# Patient Record
Sex: FEMALE | Race: WHITE | HISPANIC OR LATINO | Employment: PART TIME | URBAN - METROPOLITAN AREA
[De-identification: names, ages, dates, MRNs, and addresses within clinical notes are randomized per-mention and may not be internally consistent; named-entity substitution may affect disease eponyms.]

---

## 2023-03-06 PROBLEM — G44.86 CERVICOGENIC HEADACHE: Status: ACTIVE | Noted: 2023-03-06

## 2023-03-06 PROBLEM — H60.90 OTITIS EXTERNA: Status: ACTIVE | Noted: 2023-03-06

## 2023-03-06 PROBLEM — J30.2 SEASONAL ALLERGIES: Status: ACTIVE | Noted: 2023-03-06

## 2023-03-06 RX ORDER — NORGESTIMATE AND ETHINYL ESTRADIOL 0.25-0.035
KIT ORAL
COMMUNITY
Start: 2019-07-30

## 2023-03-06 RX ORDER — FLUTICASONE PROPIONATE 50 MCG
1-2 SPRAY, SUSPENSION (ML) NASAL DAILY
COMMUNITY
Start: 2019-10-24

## 2023-03-22 ENCOUNTER — APPOINTMENT (OUTPATIENT)
Dept: PRIMARY CARE | Facility: CLINIC | Age: 18
End: 2023-03-22

## 2023-07-27 ENCOUNTER — OFFICE VISIT (OUTPATIENT)
Dept: PRIMARY CARE | Facility: CLINIC | Age: 18
End: 2023-07-27

## 2023-07-27 VITALS
RESPIRATION RATE: 16 BRPM | TEMPERATURE: 98 F | BODY MASS INDEX: 22.82 KG/M2 | WEIGHT: 128.8 LBS | SYSTOLIC BLOOD PRESSURE: 104 MMHG | HEIGHT: 63 IN | HEART RATE: 82 BPM | OXYGEN SATURATION: 98 % | DIASTOLIC BLOOD PRESSURE: 64 MMHG

## 2023-07-27 DIAGNOSIS — Z02.5 SPORTS PHYSICAL: Primary | ICD-10-CM

## 2023-07-27 DIAGNOSIS — Z02.5 ROUTINE SPORTS PHYSICAL EXAM: ICD-10-CM

## 2023-07-27 PROCEDURE — 99202 OFFICE O/P NEW SF 15 MIN: CPT | Performed by: NURSE PRACTITIONER

## 2023-07-27 PROCEDURE — 1036F TOBACCO NON-USER: CPT | Performed by: NURSE PRACTITIONER

## 2023-07-27 RX ORDER — SPIRONOLACTONE 100 MG/1
100 TABLET, FILM COATED ORAL DAILY
COMMUNITY
Start: 2023-06-09

## 2023-07-27 ASSESSMENT — PATIENT HEALTH QUESTIONNAIRE - PHQ9
2. FEELING DOWN, DEPRESSED OR HOPELESS: NOT AT ALL
2. FEELING DOWN, DEPRESSED OR HOPELESS: NOT AT ALL
SUM OF ALL RESPONSES TO PHQ9 QUESTIONS 1 AND 2: 0
1. LITTLE INTEREST OR PLEASURE IN DOING THINGS: NOT AT ALL
SUM OF ALL RESPONSES TO PHQ9 QUESTIONS 1 AND 2: 0
1. LITTLE INTEREST OR PLEASURE IN DOING THINGS: NOT AT ALL

## 2023-07-27 ASSESSMENT — ENCOUNTER SYMPTOMS
OCCASIONAL FEELINGS OF UNSTEADINESS: 0
LOSS OF SENSATION IN FEET: 0
DEPRESSION: 0

## 2023-07-27 NOTE — PROGRESS NOTES
Subjective   Patient ID: Pham Savage is a 17 y.o. female who is here for physical assessment prior to engagement in sport activity.     HPI  Patient is a 17 y.o. female who CONSULTED AT [facility] today. Patient is for physical assessment prior to engagement in sport activity. She is accompanied by a family member today at this clinic. Patient states that she is planning to join volleyball team of her school. She denies any symptoms. She denies any allergies. She is not taking any medications. Her last dental exam was within a year and her last ophthalmology visit was within a year.    Review of Systems  General: no weight loss, generally healthy,  Head:  no headaches, no injury  Eyes: no tearing, no pain,   Ears: no change in hearing, no discharge  Mouth:  no sore throat  Nose: no discharge, no congestion, no bleeding,   Neck: no pain,   [place] pulmonary: no dyspnea, no wheezing, no cough, no chest pain,  GI: no abdominal pains, no bowel habit changes, no emesis,  : no pain on urination, no change in nature of urine  Musculoskeletal: no muscle pain, no joint pain, no limitation of range of motion,   Constitutional: no fever, no chills,     Objective   Physical Exam  General: fairly nourished, fairly developed, in no acute distress  Head: normocephalic, no lesions, no sinus tenderness  Eyes: pink palpebral conjunctiva, anicteric sclerae, 20/20 both eyes  Ears: clear external auditory canals, no ear discharge,   Nose: nasal mucosa normal, no nasal discharge,  Throat: clear, no exudate,   Neck: supple,   Chest: symmetrical chest expansion, clear breath sounds,   Heart: regular rate, normal rhythm,   Abdomen: soft, non-tender, normoactive bowel sounds, no mass palpated  Musculoskeletal: no limitation of range of motion  Extremities: full and equal peripheral pulses, no edema,    Assessment/Plan   Problem List Items Addressed This Visit    None  Visit Diagnoses       Sports  physical    -  Primary    Routine sports physical exam            DISCHARGE SUMMARY:   Patient educated and advised on health maintenance, accident avoidance, healthy diet, importance of exercise, mental health, and prevention of communicable diseases. Patient given permit in joining planned activity without restrictions. Patient  verbalized understanding.    Patient to come back as needed for new symptoms.

## 2023-07-27 NOTE — PROGRESS NOTES
"Subjective   Patient ID: Pham Savage is a 17 y.o. female who presents for Annual Exam.    Pt is in office for a sports physical for school.        Review of Systems    Objective   /64   Pulse 82   Temp 36.7 °C (98 °F) (Temporal)   Resp 16   Ht 1.6 m (5' 3\")   Wt 58.4 kg (128 lb 12.8 oz)   SpO2 98%   BMI 22.82 kg/m²     Physical Exam    Assessment/Plan          "

## 2023-07-27 NOTE — PATIENT INSTRUCTIONS
DISCHARGE SUMMARY:   Patient educated and advised on health maintenance, accident avoidance, healthy diet, importance of exercise, mental health, and prevention of communicable diseases. Patient given permit in joining planned activity without restrictions. Patient  verbalized understanding.    Patient to come back as needed for new symptoms.

## 2023-10-03 ENCOUNTER — HOSPITAL ENCOUNTER (EMERGENCY)
Facility: HOSPITAL | Age: 18
Discharge: HOME | End: 2023-10-03
Attending: EMERGENCY MEDICINE

## 2023-10-03 ENCOUNTER — APPOINTMENT (OUTPATIENT)
Dept: RADIOLOGY | Facility: HOSPITAL | Age: 18
End: 2023-10-03

## 2023-10-03 VITALS
HEIGHT: 63 IN | WEIGHT: 125 LBS | BODY MASS INDEX: 22.15 KG/M2 | TEMPERATURE: 97 F | OXYGEN SATURATION: 100 % | DIASTOLIC BLOOD PRESSURE: 58 MMHG | HEART RATE: 69 BPM | SYSTOLIC BLOOD PRESSURE: 127 MMHG | RESPIRATION RATE: 16 BRPM

## 2023-10-03 DIAGNOSIS — S46.001A INJURY OF RIGHT ROTATOR CUFF, INITIAL ENCOUNTER: Primary | ICD-10-CM

## 2023-10-03 PROCEDURE — 99284 EMERGENCY DEPT VISIT MOD MDM: CPT | Performed by: EMERGENCY MEDICINE

## 2023-10-03 PROCEDURE — 73030 X-RAY EXAM OF SHOULDER: CPT | Mod: RIGHT SIDE | Performed by: RADIOLOGY

## 2023-10-03 PROCEDURE — 71100 X-RAY EXAM RIBS UNI 2 VIEWS: CPT | Mod: RT

## 2023-10-03 PROCEDURE — 71100 X-RAY EXAM RIBS UNI 2 VIEWS: CPT | Mod: RIGHT SIDE | Performed by: RADIOLOGY

## 2023-10-03 PROCEDURE — 73030 X-RAY EXAM OF SHOULDER: CPT | Mod: RT,FY

## 2023-10-03 RX ORDER — KETOROLAC TROMETHAMINE 10 MG/1
10 TABLET, FILM COATED ORAL EVERY 6 HOURS PRN
Qty: 20 TABLET | Refills: 0 | Status: SHIPPED | OUTPATIENT
Start: 2023-10-03 | End: 2023-10-08

## 2023-10-03 RX ORDER — ORPHENADRINE CITRATE 100 MG/1
100 TABLET, EXTENDED RELEASE ORAL 2 TIMES DAILY PRN
Qty: 20 TABLET | Refills: 0 | Status: SHIPPED | OUTPATIENT
Start: 2023-10-03 | End: 2023-10-13

## 2023-10-03 ASSESSMENT — LIFESTYLE VARIABLES
HAVE PEOPLE ANNOYED YOU BY CRITICIZING YOUR DRINKING: NO
HAVE YOU EVER FELT YOU SHOULD CUT DOWN ON YOUR DRINKING: NO
EVER HAD A DRINK FIRST THING IN THE MORNING TO STEADY YOUR NERVES TO GET RID OF A HANGOVER: NO
EVER FELT BAD OR GUILTY ABOUT YOUR DRINKING: NO

## 2023-10-03 ASSESSMENT — PAIN DESCRIPTION - DESCRIPTORS: DESCRIPTORS: TENDER;DISCOMFORT

## 2023-10-03 ASSESSMENT — PAIN DESCRIPTION - LOCATION: LOCATION: SHOULDER

## 2023-10-03 ASSESSMENT — PAIN SCALES - GENERAL: PAINLEVEL_OUTOF10: 6

## 2023-10-03 ASSESSMENT — COLUMBIA-SUICIDE SEVERITY RATING SCALE - C-SSRS
1. IN THE PAST MONTH, HAVE YOU WISHED YOU WERE DEAD OR WISHED YOU COULD GO TO SLEEP AND NOT WAKE UP?: NO
2. HAVE YOU ACTUALLY HAD ANY THOUGHTS OF KILLING YOURSELF?: NO
6. HAVE YOU EVER DONE ANYTHING, STARTED TO DO ANYTHING, OR PREPARED TO DO ANYTHING TO END YOUR LIFE?: NO

## 2023-10-03 ASSESSMENT — PAIN DESCRIPTION - PAIN TYPE: TYPE: ACUTE PAIN

## 2023-10-03 ASSESSMENT — PAIN DESCRIPTION - ORIENTATION: ORIENTATION: RIGHT

## 2023-10-03 ASSESSMENT — PAIN - FUNCTIONAL ASSESSMENT: PAIN_FUNCTIONAL_ASSESSMENT: 0-10

## 2023-10-03 ASSESSMENT — PAIN DESCRIPTION - FREQUENCY: FREQUENCY: CONSTANT/CONTINUOUS

## 2023-10-03 ASSESSMENT — PAIN DESCRIPTION - PROGRESSION: CLINICAL_PROGRESSION: GRADUALLY WORSENING

## 2023-10-03 NOTE — ED PROVIDER NOTES
HPI   Chief Complaint   Patient presents with   • Shoulder Pain     Pt injured her right shoulder, right side, and back while diving during volleyball. Injury occurred a week ago.       Disposition    Chief complaint shoulder pain  History of present illness this is a 18-year-old  female was playing volleyball week ago injured her right shoulder fell onto it.  And is here for assessment patient reports that she also ran and fell while playing volleyball and sustained chest contusion and her ribs are hurting on the right as well as the shoulder.  There was no trauma of the head or neck or spine or abdomen she is here with a blood pressure 132/80 pulse 60 pulse ox 100% respiratory rate 18                        [place] Coma Scale Score: 15                  Patient History   Past Medical History:   Diagnosis Date   • Other specified health status     Patient denies medical problems     Past Surgical History:   Procedure Laterality Date   • OTHER SURGICAL HISTORY  07/30/2019    No history of surgery     No family history on file.  Social History     Tobacco Use   • Smoking status: Never   • Smokeless tobacco: Never   Substance Use Topics   • Alcohol use: Yes     Comment: on the weekends   • Drug use: Yes     Types: Marijuana       Physical Exam   ED Triage Vitals [10/03/23 1655]   Temp Heart Rate Resp BP   36.1 °C (97 °F) 60 18 132/80      SpO2 Temp Source Heart Rate Source Patient Position   100 % Temporal -- Sitting      BP Location FiO2 (%)     Right arm --       Physical Exam  Vitals and nursing note reviewed. Exam conducted with a chaperone present.   Constitutional:       Appearance: Normal appearance.   HENT:      Head: Normocephalic and atraumatic.      Nose: Nose normal.      Mouth/Throat:      Mouth: Mucous membranes are moist.   Eyes:      Pupils: Pupils are equal, round, and reactive to light.   Cardiovascular:      Rate and Rhythm: Normal rate and regular rhythm.   Pulmonary:      Effort: Pulmonary  effort is normal.      Comments: Right rib tenderness along the axillary and costal margin region no bruising noted no crepitus  Chest:      Chest wall: Tenderness present.   Abdominal:      Palpations: Abdomen is soft.   Musculoskeletal:         General: Normal range of motion.      Cervical back: Normal range of motion.      Comments: Right shoulder was limited range of motion there is no tenderness along the clavicle no crepitus noted   Skin:     General: Skin is warm and dry.      Capillary Refill: Capillary refill takes less than 2 seconds.   Neurological:      General: No focal deficit present.      Mental Status: She is alert.   Psychiatric:         Mood and Affect: Mood normal.       ED Course & MDM   Diagnoses as of 10/05/23 0703   Injury of right rotator cuff, initial encounter       Medical Decision Making  Differential diagnosis considered for this patient  #1 right rotator cuff syndrome   #2 right shoulder fracture dislocation  #3 right rib fracture  #4 pneumothorax  #5 chest contusion  Considering the above differential diagnosis following test and treatments were considered normal but shared decision making x-ray of the right ribs x-ray right shoulder      Labs Reviewed - No data to display     XR shoulder right 2+ views   Final Result   No acute osseous abnormality of the right shoulder.             MACRO:   None        Signed by: Arnulfo Rodriguez 10/3/2023 7:00 PM   Dictation workstation:   [id number]      XR ribs right 2 views   Final Result   1.  No acute displaced rib fracture is seen.             Signed by: Arnulfo Rodriguez 10/3/2023 6:58 PM   Dictation workstation:   [id number]           Procedure  Procedures     Teresa Roque MD  10/05/23 0703